# Patient Record
Sex: FEMALE | Race: WHITE | NOT HISPANIC OR LATINO | Employment: FULL TIME | URBAN - METROPOLITAN AREA
[De-identification: names, ages, dates, MRNs, and addresses within clinical notes are randomized per-mention and may not be internally consistent; named-entity substitution may affect disease eponyms.]

---

## 2024-09-04 ENCOUNTER — OFFICE VISIT (OUTPATIENT)
Dept: URGENT CARE | Facility: URGENT CARE | Age: 49
End: 2024-09-04
Payer: COMMERCIAL

## 2024-09-04 VITALS
RESPIRATION RATE: 14 BRPM | DIASTOLIC BLOOD PRESSURE: 82 MMHG | SYSTOLIC BLOOD PRESSURE: 130 MMHG | HEART RATE: 97 BPM | TEMPERATURE: 98.5 F | OXYGEN SATURATION: 100 % | WEIGHT: 211 LBS

## 2024-09-04 DIAGNOSIS — H65.01 NON-RECURRENT ACUTE SEROUS OTITIS MEDIA OF RIGHT EAR: Primary | ICD-10-CM

## 2024-09-04 DIAGNOSIS — H93.8X3 EAR PRESSURE, BILATERAL: ICD-10-CM

## 2024-09-04 PROCEDURE — 99203 OFFICE O/P NEW LOW 30 MIN: CPT | Performed by: FAMILY MEDICINE

## 2024-09-04 RX ORDER — MULTIVITAMIN WITH IRON
1 TABLET ORAL DAILY
COMMUNITY

## 2024-09-04 NOTE — PROGRESS NOTES
URGENT CARE    ASSESSMENT AND PLAN:      ICD-10-CM    1. Non-recurrent acute serous otitis media of right ear  H65.01       2. Ear pressure, bilateral  H93.8X3           Examination revealing serous otitis media without signs of infection.  Supportive and OTC measures outlined in AVS and discussed.        Reviewed alarm signs needing ED evaluation.     Follow up with primary care provider with any problems, questions or concerns or if symptoms worsen or fail to improve. Patient verbalized understanding and is agreeable to plan. The patient was discharged ambulatory and in stable condition.            Chief Complaint   Patient presents with    Ear Problem     Right ear issues x 4 days -- BUT please both -- has a slight cold - taking clariten     SUBJECTIVE:  Idania Lin is a 49 year old female who presents for evaluation of bilateral ear fullness and pressure for 4 day(s). Swimming: No. Drainage: No  Additional symptoms include none.  Treatment measures tried include: Decongestants and antihistamine  History of recurrent otitis: no  Predisposing factors include: seasonal allergies and recent cold    History reviewed. No pertinent past medical history.  Current Outpatient Medications   Medication Sig Dispense Refill    B-Complex-C TABS Take 1 tablet by mouth daily.       No current facility-administered medications for this visit.     Social History     Tobacco Use    Smoking status: Every Day     Types: Cigarettes    Smokeless tobacco: Never   Substance Use Topics    Alcohol use: Not on file     Allergies   Allergen Reactions    Codeine      Very bad reacation       Review of Systems  All systems reviewed and negative except per HPI.    OBJECTIVE:  /82 (BP Location: Right arm, Patient Position: Chair, Cuff Size: Adult Regular)   Pulse 97   Temp 98.5  F (36.9  C) (Oral)   Resp 14   Wt 95.7 kg (211 lb)   LMP 08/26/2024 (Approximate)   SpO2 100%      Physical Exam  GENERAL: alert and no  distress  EYES: Eyes grossly normal to inspection, PERRL and conjunctivae and sclerae normal  HENT: L ear canal and TM's normal.  R ear is normal with TM showing clear fluid and bubbles.  No erythema or bulging noted.  Nose and mouth without ulcers or lesions  NECK: no adenopathy, no asymmetry, masses, or scars  RESP: lungs clear to auscultation - no rales, rhonchi or wheezes  CV: regular rate and rhythm, normal S1 S2, no S3 or S4, no murmur, click or rub  SKIN: no suspicious lesions or rashes